# Patient Record
Sex: MALE | Race: WHITE | NOT HISPANIC OR LATINO | URBAN - METROPOLITAN AREA
[De-identification: names, ages, dates, MRNs, and addresses within clinical notes are randomized per-mention and may not be internally consistent; named-entity substitution may affect disease eponyms.]

---

## 2018-07-31 ENCOUNTER — OFFICE VISIT - RIVER FALLS (OUTPATIENT)
Dept: FAMILY MEDICINE | Facility: CLINIC | Age: 58
End: 2018-07-31

## 2018-07-31 ASSESSMENT — MIFFLIN-ST. JEOR: SCORE: 2194.25

## 2019-07-05 ENCOUNTER — COMMUNICATION - RIVER FALLS (OUTPATIENT)
Dept: FAMILY MEDICINE | Facility: CLINIC | Age: 59
End: 2019-07-05

## 2019-07-05 ENCOUNTER — OFFICE VISIT - RIVER FALLS (OUTPATIENT)
Dept: FAMILY MEDICINE | Facility: CLINIC | Age: 59
End: 2019-07-05

## 2019-07-05 ASSESSMENT — MIFFLIN-ST. JEOR: SCORE: 2194.25

## 2022-02-12 VITALS
HEART RATE: 84 BPM | DIASTOLIC BLOOD PRESSURE: 72 MMHG | OXYGEN SATURATION: 95 % | WEIGHT: 292 LBS | HEIGHT: 74 IN | BODY MASS INDEX: 37.47 KG/M2 | TEMPERATURE: 100.8 F | SYSTOLIC BLOOD PRESSURE: 126 MMHG

## 2022-02-12 VITALS
DIASTOLIC BLOOD PRESSURE: 86 MMHG | WEIGHT: 292 LBS | SYSTOLIC BLOOD PRESSURE: 140 MMHG | HEART RATE: 72 BPM | OXYGEN SATURATION: 94 % | TEMPERATURE: 99 F | BODY MASS INDEX: 37.47 KG/M2 | RESPIRATION RATE: 20 BRPM | HEIGHT: 74 IN

## 2022-02-15 NOTE — NURSING NOTE
"Comprehensive Intake Entered On:  7/5/2019 3:53 PM CDT    Performed On:  7/5/2019 3:44 PM CDT by Jerardo Valle CMA               Summary   Chief Complaint :   Pt here for 2 \"Heavy\" bloody noses that started today.  Pt states they were both from the left nostril.   Weight Measured :   292 lb(Converted to: 292 lb 0 oz, 132.45 kg)    Height Measured :   74 in(Converted to: 6 ft 2 in, 187.96 cm)    Body Mass Index :   37.49 kg/m2 (HI)    Body Surface Area :   2.63 m2   Systolic Blood Pressure :   140 mmHg (HI)    Diastolic Blood Pressure :   86 mmHg (HI)    Mean Arterial Pressure :   104 mmHg   Peripheral Pulse Rate :   72 bpm   BP Site :   Right arm   Pulse Site :   Radial artery   Temperature Tympanic :   99 DegF(Converted to: 37.2 DegC)    Respiratory Rate :   20 br/min   Oxygen Saturation :   94 %   Jerardo Valle CMA - 7/5/2019 3:44 PM CDT   Meds / Allergies   (As Of: 7/5/2019 3:53:09 PM CDT)   Allergies (Active)   No known allergies  Estimated Onset Date:   Unspecified ; Created By:   [employer] User for [id number]; Reaction Status:   Active ; Substance:   No known allergies ; Updated By:   Generated Domain User for [id number]; Reviewed Date:   7/5/2019 3:49 PM CDT        Medication List   (As Of: 7/5/2019 3:53:09 PM CDT)   Prescription/Discharge Order    sulfamethoxazole-trimethoprim  :   sulfamethoxazole-trimethoprim ; Status:   Processing ; Ordered As Mnemonic:   sulfamethoxazole-trimethoprim 800 mg-160 mg oral tablet ; Ordering Provider:   Maynor Joseph MD; Action Display:   Complete ; Catalog Code:   sulfamethoxazole-trimethoprim ; Order Dt/Tm:   7/5/2019 3:44:45 PM            Home Meds    multivitamin  :   multivitamin ; Status:   Documented ; Ordered As Mnemonic:   DAILY MULTIPLE VITAMINS ([id number]) ; Simple Display Line:   UNKNOWNUNIT, po, daily ; Catalog Code:   multivitamin ; Order Dt/Tm:   2/24/2012 6:18:20 PM            Past Medical History   Past Medical History   (As Of: 7/5/2019 3:53:09 PM CDT) "     Procedures / Surgeries        -    Procedure History   (As Of: 7/5/2019 3:53:09 PM CDT)     Family History   Family History   (As Of: 7/5/2019 3:53:09 PM CDT)     Social History   Social History   (As Of: 7/5/2019 3:53:09 PM CDT)

## 2022-02-15 NOTE — PROGRESS NOTES
"   Patient:   LATRICE JOHN            MRN: [id number]            FIN: [id number]               Age:   58 years     Sex:  Male     :  1960   Associated Diagnoses:   Epistaxis   Author:   Derrick Wheatley PA-C      Chief Complaint   2019 3:44 PM CDT     Pt here for 2 \"Heavy\" bloody noses that started today.  Pt states they were both from the left nostril.      History of Present Illness   Chief complaint and symptoms noted above and confirmed with patient   as above  the first bleed this am lasted about 5 minutes  the second one this afternoon lasted about 20 minutes  he used some frankincense essential oils and got the bleeding to stop  no hx of epistaxis  no new medications, not taking ASA, no cough or cold sxs         Review of Systems   Ear/Nose/Mouth/Throat:       Epistaxis: Left nostril.       Health Status   Allergies:    Allergic Reactions (All)  Severity Not Documented  No known allergies (No reactions were documented)   Medications:  (Selected)   Documented Medications  Documented  DAILY MULTIPLE VITAMINS ([id number]): UNKNOWNUNIT, po, daily, 0 Refill(s)   Problem list:    All Problems  Obesity / SNOMED CT 9272321401 / Probable      Histories   Past Medical History:    No active or resolved past medical history items have been selected or recorded.   Family History:    No family history items have been selected or recorded.      Physical Examination   Vital Signs   2019 3:44 PM CDT Temperature Tympanic 99 DegF    Peripheral Pulse Rate 72 bpm    Pulse Site Radial artery    Respiratory Rate 20 br/min    Systolic Blood Pressure 140 mmHg  HI    Diastolic Blood Pressure 86 mmHg  HI    Mean Arterial Pressure 104 mmHg    BP Site Right arm    Oxygen Saturation 94 %      Measurements from flowsheet : Measurements   2019 3:44 PM CDT Height Measured - Standard 74 in    Weight Measured - Standard 292 lb    BSA 2.63 m2    Body Mass Index 37.49 kg/m2  HI      General:  No acute distress.    HENT:  Tympanic " membranes are clear, No pharyngeal erythema, No sinus tenderness, right nare is patent, left flare is inflamed, no active bleeding but irritation at floor of nare,  bacitracin is applied.       Impression and Plan   Diagnosis     Epistaxis (QKC54-BE R04.0).     Summary:  no active bleeding at this time,  use bacitracin to nare tid for the next week, given nose clip to use for recurrent bleeding and  use oxymetazoline nasal spray, follow up if not improving.    Orders     Orders   Pharmacy:  oxymetazoline 0.05% nasal spray (Prescribe): See Instructions, Instructions: 2 spray(s) to affected nare q1 hr prn nose bleed, # 30 mL, 0 Refill(s), Type: Maintenance, called to pharmacy (Rx).     Orders   Charges (Evaluation and Management):  [id number] office outpatient visit 15 minutes (Charge) (Order): Quantity: 1, Epistaxis.

## 2022-02-15 NOTE — PROGRESS NOTES
Patient:   LATRICE JOHN            MRN: [id number]            FIN: [id number]               Age:   57 years     Sex:  Male     :  1960   Associated Diagnoses:   Cellulitis of finger   Author:   Maynor Joseph MD      Chief Complaint   2018 3:23 PM CDT    Here for infection on right middle finger, middle finger is swollen, red, and hot, he cut it on plastic two days ago      History of Present Illness   see chief complaint as noted above and confirmed with the patient      57 year old male presents today with concern of infection in finger. He cut his right middle finger on a piece of plastic vinyl while working on a fence, he went and washed it and the cut was small, not deep, and did not bleed much, he attempted to see if anything was in the cut and did not see any foreign body present, this occured two days ago. Since then he has been applying Colloidal silver to the finger. He has noticed it has become more swollen, tender, and quite warm to the touch.       Review of Systems   Constitutional:  Fever.    Respiratory:  No cough.    Gastrointestinal:  No nausea, No vomiting, No diarrhea.    Integumentary:  right middle finger swollen, red , No rash.       Health Status   Allergies:    Allergic Reactions (Selected)  Severity Not Documented  No known allergies (No reactions were documented)   Medications:  (Selected)   Documented Medications  Documented  AFRIN 0.05% NASAL SPRAY ([id number]): UNKNOWNUNIT, Not Listed, Instructions: 2 spray(s) nasal 30 minutes before takeoff., # 30 mL, 0 Refill(s)  DAILY MULTIPLE VITAMINS (y27880): UNKNOWNUNIT, po, daily, 0 Refill(s)   Problem list:    All Problems  Obesity / SNOMED CT [id number] / Probable      Histories   Past Medical History:    No active or resolved past medical history items have been selected or recorded.   Family History:    No family history items have been selected or recorded.   Procedure history:    No active procedure history items have been  selected or recorded.   Social History:             No active social history items have been recorded.      Physical Examination   Vital Signs   7/31/2018 3:23 PM CDT Temperature Tympanic 100.8 DegF  HI    Peripheral Pulse Rate 84 bpm    Pulse Site Radial artery    Systolic Blood Pressure 126 mmHg    Diastolic Blood Pressure 72 mmHg    Mean Arterial Pressure 90 mmHg    BP Site Right arm    Oxygen Saturation 95 %      Measurements from flowsheet : Measurements   7/31/2018 3:23 PM CDT Height Measured - Standard 74.00 in    Weight Measured - Standard 292 lb    BSA 2.63 m2    Body Mass Index 37.49 kg/m2  HI      General:  Alert and oriented, No acute distress.    Eye:  Pupils are equal, round and reactive to light, Normal conjunctiva.    HENT:  Oral mucosa is moist.    Neck:  Supple.    Respiratory:  Respirations are non-labored.    Cardiovascular:  Normal rate, Regular rhythm, No edema.    Gastrointestinal:  Non-distended.    Musculoskeletal:  Normal gait.    Integumentary:  Warm, No rash, Right middle finger has a very small cut at the tip of the finger, no bleeding, no foreign body seen in the cut. The finger itself is swollen, red, warm, and tender. .    Psychiatric:  Cooperative, Appropriate mood & affect, Normal judgment.       Review / Management   Results review      Impression and Plan       Diagnosis     Cellulitis of finger (OBL15-PM L03.019).     Plan:  Administered 1 gram of Rocephin-patient tolerated well without reaction.     Prescribed Septra DS.     Discussed what to watch for and when to return.     Also advised some Ibuprofen due to pain and fever .    Moriah PEACOCK Medical Assistant acted solely as a scribe for, and in presence of Dr. Maynor Joseph who performed the services.

## 2022-02-15 NOTE — TELEPHONE ENCOUNTER
---------------------  From: Jerardo Valle CMA ([facility] Message Pool ([email or web address]))   Sent: 7/5/2019 4:29:07 PM CDT  Subject: General Message     Phone Message    PCP:   _      Time of Call:  425 pm       Person Calling:    Phone number:  [phone]    Reason for call:  _  Returned call at: _    Note:   Pt seen today by [facility] for epistaxis.  I called pt and informed him that [facility] would like to prescribe 0.05% oxymetazoline nasal spray PRN for nosebleeds and would like to know which pharmacy he uses.  Pt informs me he uses [facility].  Rx called into [facility] per pt request.  Jerardo Valle CMA    Last office visit and reason:  7/5/19 epistaxis    Transferred to: _